# Patient Record
Sex: MALE | Race: BLACK OR AFRICAN AMERICAN | Employment: UNEMPLOYED | URBAN - METROPOLITAN AREA
[De-identification: names, ages, dates, MRNs, and addresses within clinical notes are randomized per-mention and may not be internally consistent; named-entity substitution may affect disease eponyms.]

---
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
## 2022-03-19 PROBLEM — H65.33 CHRONIC MUCOID OTITIS MEDIA OF BOTH EARS: Status: ACTIVE | Noted: 2021-12-08

## 2022-11-23 ENCOUNTER — OFFICE VISIT (OUTPATIENT)
Dept: ENT CLINIC | Age: 22
End: 2022-11-23
Payer: MEDICAID

## 2022-11-23 VITALS
HEART RATE: 80 BPM | DIASTOLIC BLOOD PRESSURE: 80 MMHG | BODY MASS INDEX: 24.17 KG/M2 | OXYGEN SATURATION: 97 % | HEIGHT: 69 IN | SYSTOLIC BLOOD PRESSURE: 122 MMHG | RESPIRATION RATE: 17 BRPM | WEIGHT: 163.2 LBS

## 2022-11-23 DIAGNOSIS — J36 PERITONSILLAR ABSCESS: Primary | ICD-10-CM

## 2022-11-23 DIAGNOSIS — H65.33 CHRONIC MUCOID OTITIS MEDIA OF BOTH EARS: ICD-10-CM

## 2022-11-23 PROCEDURE — 99213 OFFICE O/P EST LOW 20 MIN: CPT | Performed by: OTOLARYNGOLOGY

## 2022-11-23 PROCEDURE — 10021 FNA BX W/O IMG GDN 1ST LES: CPT | Performed by: OTOLARYNGOLOGY

## 2022-11-23 RX ORDER — PREDNISONE 20 MG/1
20 TABLET ORAL 2 TIMES DAILY
Qty: 6 TABLET | Refills: 0 | Status: SHIPPED | OUTPATIENT
Start: 2022-11-23 | End: 2022-11-26

## 2022-11-23 NOTE — PROGRESS NOTES
Subjective:    Saleem Arrow   22 y.o.   2000     Followup Visit    Location -ears    Quality -hearing loss    Severity -moderate    Duration -chronic    Timing -ongoing    Context -presents today 9 days postop bilateral tympanostomy tube placement for bilateral mucoid effusions and hearing loss    Modifying Features -tubes seem to help    Associated symptoms/signs -hearing loss    11/23/2022  1 year follow-up patient had tympanostomy tubes in November 2021. Did not follow-up as scheduled 6 months ago, here for new problem. States 9 days of sore throat worse on the right side. For started Z-Jordan did not help, went to ER this morning was given antibiotics, CT scan and clindamycin prescription    Review of Systems  Review of Systems   Constitutional:  Negative for chills and fever. HENT:  Positive for hearing loss and sore throat. Negative for ear pain, nosebleeds and tinnitus. Eyes:  Negative for blurred vision and double vision. Respiratory:  Negative for cough, sputum production and shortness of breath. Cardiovascular:  Negative for chest pain and palpitations. Gastrointestinal:  Negative for heartburn, nausea and vomiting. Musculoskeletal:  Positive for joint pain. Negative for neck pain. Skin: Negative. Neurological:  Negative for dizziness, speech change, weakness and headaches. Endo/Heme/Allergies:  Negative for environmental allergies. Does not bruise/bleed easily. Psychiatric/Behavioral:  Negative for memory loss. The patient does not have insomnia. Past Medical History:   Diagnosis Date    Asthma     COVID-19 vaccine series completed     Pfizer    Eczema     Otitis media     Seasonal allergic reaction      Prior to Admission medications    Medication Sig Start Date End Date Taking? Authorizing Provider   predniSONE (DELTASONE) 20 mg tablet Take 1 Tablet by mouth two (2) times a day for 3 days.  11/23/22 11/26/22 Yes Dudley Gupta MD   ofloxacin (FLOXIN) 0.3 % ophthalmic solution instill 1 to 2 drops INTO AFFECTED EYE(S) every 2 to 4 hours for . ..  (REFER TO PRESCRIPTION NOTES). Patient not taking: Reported on 11/23/2022 9/25/21   Provider, Historical            Objective:     Visit Vitals  /80   Pulse 80   Resp 17   Ht 5' 9\" (1.753 m)   Wt 163 lb 3.2 oz (74 kg)   SpO2 97%   BMI 24.10 kg/m²        Physical Exam  Vitals reviewed. Constitutional:       General: He is awake. Appearance: Normal appearance. He is normal weight. HENT:      Head: Normocephalic and atraumatic. Jaw: There is normal jaw occlusion. No trismus, tenderness or malocclusion. Salivary Glands: Right salivary gland is not diffusely enlarged or tender. Left salivary gland is not diffusely enlarged or tender. Right Ear: Hearing, ear canal and external ear normal. No PE tube. Tympanic membrane is retracted. Left Ear: Hearing, tympanic membrane, ear canal and external ear normal. No PE tube. Ears:      Couch exam findings: Does not lateralize. Right Rinne: AC > BC. Left Rinne: AC > BC. Nose: No septal deviation, mucosal edema or rhinorrhea. Right Turbinates: Not enlarged, swollen or pale. Left Turbinates: Not enlarged, swollen or pale. Right Sinus: No maxillary sinus tenderness or frontal sinus tenderness. Left Sinus: No maxillary sinus tenderness or frontal sinus tenderness. Mouth/Throat:      Lips: Pink. Mouth: Mucous membranes are moist. No oral lesions. Dentition: Normal dentition. No gum lesions. Tongue: No lesions. Palate: No mass and lesions. Pharynx: Oropharynx is clear. Uvula midline. Posterior oropharyngeal erythema present. Tonsils: Tonsillar exudate and tonsillar abscess present. 4+ on the right. 3+ on the left. Eyes:      General: Vision grossly intact. Extraocular Movements: Extraocular movements intact. Right eye: No nystagmus. Left eye: No nystagmus.       Pupils: Pupils are equal, round, and reactive to light. Neck:      Thyroid: No thyroid mass, thyromegaly or thyroid tenderness. Trachea: Trachea and phonation normal. No tracheal tenderness. Cardiovascular:      Rate and Rhythm: Normal rate and regular rhythm. Pulmonary:      Effort: Pulmonary effort is normal.      Breath sounds: Normal breath sounds. No stridor. No wheezing. Musculoskeletal:         General: Normal range of motion. Cervical back: Normal range of motion. No edema or erythema. Lymphadenopathy:      Cervical: No cervical adenopathy. Skin:     General: Skin is warm and dry. Neurological:      General: No focal deficit present. Mental Status: He is alert and oriented to person, place, and time. Mental status is at baseline. Coordination: Romberg sign negative. Gait: Gait is intact. Psychiatric:         Mood and Affect: Mood normal.         Behavior: Behavior normal. Behavior is cooperative. Needle Aspiration Peritonsillar Abscess    Patient throat is sprayed with Cetacaine. I then injected 2cc of 1% lidocaine with 1:100,000 parts epinephrine into the right peritonsillar mucosa. I then aspirated the peritonsillar space with an 18ga needle. 3 passes are made. Once all purulence was  aspirated, hemostasis was achieved with cold water rinse. Assessment/Plan:     Encounter Diagnoses   Name Primary? Peritonsillar abscess Yes    Chronic mucoid otitis media of both ears      Review [facility]. He has a right peritonsillar abscess, aspirated today. Agree with the clindamycin I am adding 3 days of prednisone and he should take ibuprofen for pain. His tympanostomy tubes are extruded and he has some mild retraction on the right side but no effusion noted at this time. Follow-up in 3 weeks to recheck his throat and we may do a follow-up hearing test to see if he needs tubes replaced.     Orders Placed This Encounter    FINE NEEDLE ASP W/O IMAGING GUIDANCE predniSONE (DELTASONE) 20 mg tablet     Follow-up and Dispositions    Return in about 3 weeks (around 12/14/2022). Canelo Ramirez MD, [address]    [facility]  [facility]    [address] [address]  [place], [place] [address] [address]

## 2022-11-23 NOTE — PROGRESS NOTES
Chief Complaint   Patient presents with    Follow-up     Pharyngitis       Visit Vitals  /80   Pulse 80   Resp 17   Ht 5' 9\" (1.753 m)   Wt 163 lb 3.2 oz (74 kg)   SpO2 97%   BMI 24.10 kg/m²

## 2025-05-22 ENCOUNTER — OFFICE VISIT (OUTPATIENT)
Age: 25
End: 2025-05-22
Payer: MEDICAID

## 2025-05-22 VITALS
HEART RATE: 94 BPM | OXYGEN SATURATION: 99 % | RESPIRATION RATE: 16 BRPM | HEIGHT: 69 IN | SYSTOLIC BLOOD PRESSURE: 124 MMHG | BODY MASS INDEX: 28.85 KG/M2 | WEIGHT: 194.8 LBS | DIASTOLIC BLOOD PRESSURE: 82 MMHG

## 2025-05-22 DIAGNOSIS — J03.01 ACUTE RECURRENT STREPTOCOCCAL TONSILLITIS: Primary | ICD-10-CM

## 2025-05-22 PROCEDURE — 99204 OFFICE O/P NEW MOD 45 MIN: CPT

## 2025-05-22 RX ORDER — METHYLPREDNISOLONE 4 MG/1
TABLET ORAL
Qty: 1 KIT | Refills: 0 | Status: SHIPPED | OUTPATIENT
Start: 2025-05-22

## 2025-05-22 NOTE — PROGRESS NOTES
[facility]    Subjective:   Patient: Kenneth Burciaga  YOB: 2000  MRN: [id number]  Date of Service:  5/22/2025    New Patient Visit    Chief Complaint: Tonsil infection    History of Present Illness:   Kenneth Burciaga is a 24 y.o. male who presents today for the evaluation of bilateral tonsillitis and right sided peritonsillar abscess diagnosed at the [facility] ER last night. He notes that he had been having a sore throat since Sunday, worsening over the course of this week.  Went to the ER last night because the sore throat was getting worse.  At the ER he did test positive for group A strep negative for flu type a, B, and COVID-19. A CT neck done there showed a small 14 mm right sided peritonsillar abscess.  In the ER he was given IV steroids, antibiotics, pain medication, and ultimately discharged with a 10-day course of clindamycin.  He notes he plans to pick that prescription up this morning and start his oral antibiotic course today    He states he feels much better today, his fevers and body aches have resolved, and sore throat has improved although not completely resolved.  Denies any shortness of breath. Notes that it is easier to breathe through his nose than his mouth. He does note some pain with swallowing but states that this is improved from yesterday, and he is able to swallow and keep food down. Denies any voice changes. Notes that he has been more comfortable sleeping reclined over the past couple of days instead of lying down, and that he slept better last night than previous nights this week. He states he has had issues with recurrent strep infections over the years and that he believes he has had a peritonsillar abscess before as well. He is ultimately interested in having his tonsils removed.    According to patient's chart, he had a peritonsillar abscess in Nov 2022 which was drained in clinic by Dr. Oleary. Was scheduled for follow up in 3 weeks but

## 2025-05-29 ENCOUNTER — OFFICE VISIT (OUTPATIENT)
Age: 25
End: 2025-05-29
Payer: MEDICAID

## 2025-05-29 VITALS
BODY MASS INDEX: 29.21 KG/M2 | WEIGHT: 197.2 LBS | RESPIRATION RATE: 15 BRPM | SYSTOLIC BLOOD PRESSURE: 140 MMHG | HEIGHT: 69 IN | DIASTOLIC BLOOD PRESSURE: 80 MMHG | OXYGEN SATURATION: 99 % | HEART RATE: 63 BPM

## 2025-05-29 DIAGNOSIS — J03.01 RECURRENT STREPTOCOCCAL TONSILLITIS: Primary | ICD-10-CM

## 2025-05-29 DIAGNOSIS — J36 PERITONSILLAR ABSCESS: ICD-10-CM

## 2025-05-29 PROCEDURE — 99213 OFFICE O/P EST LOW 20 MIN: CPT

## 2025-05-29 ASSESSMENT — PATIENT HEALTH QUESTIONNAIRE - PHQ9
2. FEELING DOWN, DEPRESSED OR HOPELESS: SEVERAL DAYS
1. LITTLE INTEREST OR PLEASURE IN DOING THINGS: NOT AT ALL
SUM OF ALL RESPONSES TO PHQ QUESTIONS 1-9: 1

## 2025-05-29 NOTE — PROGRESS NOTES
Identified pt with two pt identifiers(name and ). Reviewed record in preparation for visit and have obtained necessary documentation. All patient medications has been reviewed.  Chief Complaint   Patient presents with    Follow-up    Pharyngitis     Pt states he had laryngitis for about 4-5 days at once and is following up.       Wt Readings from Last 3 Encounters:   25 89.4 kg (197 lb 3.2 oz)   25 88.4 kg (194 lb 12.8 oz)   22 74 kg (163 lb 3.2 oz)     Temp Readings from Last 3 Encounters:   No data found for Temp     BP Readings from Last 3 Encounters:   25 (!) 140/80   25 124/82   22 122/80     Pulse Readings from Last 3 Encounters:   25 63   25 94   22 80       Have you been to the ER, urgent care clinic since your last visit?  Hospitalized since your last visit?   No    Have you seen or consulted any other health care providers outside our system since your last visit?   NO
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
## 2025-06-16 ENCOUNTER — PROCEDURE VISIT (OUTPATIENT)
Age: 25
End: 2025-06-16
Payer: MEDICAID

## 2025-06-16 ENCOUNTER — OFFICE VISIT (OUTPATIENT)
Age: 25
End: 2025-06-16
Payer: MEDICAID

## 2025-06-16 VITALS
OXYGEN SATURATION: 97 % | BODY MASS INDEX: 29.38 KG/M2 | RESPIRATION RATE: 16 BRPM | HEART RATE: 59 BPM | WEIGHT: 198.4 LBS | DIASTOLIC BLOOD PRESSURE: 76 MMHG | SYSTOLIC BLOOD PRESSURE: 108 MMHG | HEIGHT: 69 IN

## 2025-06-16 DIAGNOSIS — H69.90 DYSFUNCTION OF EUSTACHIAN TUBE, UNSPECIFIED LATERALITY: ICD-10-CM

## 2025-06-16 DIAGNOSIS — H90.12 CONDUCTIVE HEARING LOSS OF LEFT EAR WITH UNRESTRICTED HEARING OF RIGHT EAR: Primary | ICD-10-CM

## 2025-06-16 DIAGNOSIS — H90.12 CONDUCTIVE HEARING LOSS OF LEFT EAR WITH UNRESTRICTED HEARING OF RIGHT EAR: ICD-10-CM

## 2025-06-16 DIAGNOSIS — J03.01 RECURRENT STREPTOCOCCAL TONSILLITIS: Primary | ICD-10-CM

## 2025-06-16 DIAGNOSIS — J30.9 ALLERGIC RHINITIS, UNSPECIFIED SEASONALITY, UNSPECIFIED TRIGGER: ICD-10-CM

## 2025-06-16 DIAGNOSIS — J36 PERITONSILLAR ABSCESS: ICD-10-CM

## 2025-06-16 PROCEDURE — 99213 OFFICE O/P EST LOW 20 MIN: CPT

## 2025-06-16 PROCEDURE — 92567 TYMPANOMETRY: CPT

## 2025-06-16 PROCEDURE — 92557 COMPREHENSIVE HEARING TEST: CPT

## 2025-06-16 RX ORDER — CLINDAMYCIN HYDROCHLORIDE 150 MG/1
CAPSULE ORAL
COMMUNITY
Start: 2025-05-22 | End: 2025-06-16 | Stop reason: ALTCHOICE

## 2025-06-16 RX ORDER — AZELASTINE HYDROCHLORIDE, FLUTICASONE PROPIONATE 137; 50 UG/1; UG/1
1 SPRAY, METERED NASAL 2 TIMES DAILY
Qty: 23 G | Refills: 3 | Status: SHIPPED | OUTPATIENT
Start: 2025-06-16
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
## 2025-06-16 NOTE — PROGRESS NOTES
Kenneth Burciaga   2000, 24 y.o. male   [id number]       Referring Provider: Arina Ch PA-C    AUDIOLOGIC EVALUATION      Kenneth Burciaga is a 24 y.o. male seen today, 6/16/2025, for an audiologic evaluation. Patient was seen by otolaryngology following today's evaluation.    PERTINENT MEDICAL HISTORY:      Kenneth Burciaga has reported a longstanding history of hearing loss. Previous audiogram showed normal hearing with mild hearing loss at 250 Hz in the left ear only. He notes no perceived changes to his hearing since his previous audiogram, noting that his left ear has consistently been poorer than his right.    He denied otalgia, aural fullness, otorrhea, tinnitus, dizziness, and imbalance.    IMPRESSIONS:      Today's results revealed mild conductive hearing loss rising to normal hearing in the left ear and normal hearing in the right ear. Excellent speech understanding when in quiet. Tympanometry indicates negative pressure in the middle ear space, consistent with abnormal middle ear/Eustachian tube function.    Follow medical recommendations of primary care physician and referring provider.    ASSESSMENT AND FINDINGS:     Otoscopy: Clear ear canals and normal tympanic membranes      RIGHT EAR:  Hearing Sensitivity: Normal hearing sensitivity 250-8000 Hz. Conductive component at 250 and 4000 Hz  Speech Reception Threshold: 25 dB HL  Word Recognition: Excellent 100%, based on NU-6 by-difficulty list at 60 dB HL using recorded speech stimuli  Tympanometry: Type C, negative pressure in the middle ear space, consistent with Eustachian tube dysfunction      LEFT EAR:  Hearing Sensitivity: Mild conductive hearing loss rising to normal hearing  Speech Reception Threshold: 25 dB HL  Word Recognition: Excellent 100%, based on NU-6 by-difficulty list at 60 dB HL using recorded speech stimuli  Tympanometry: Type C, negative pressure in the middle ear space, consistent with Eustachian tube

## 2025-08-27 ENCOUNTER — OFFICE VISIT (OUTPATIENT)
Age: 25
End: 2025-08-27
Payer: MEDICAID

## 2025-08-27 VITALS
OXYGEN SATURATION: 98 % | DIASTOLIC BLOOD PRESSURE: 80 MMHG | BODY MASS INDEX: 29.3 KG/M2 | RESPIRATION RATE: 18 BRPM | SYSTOLIC BLOOD PRESSURE: 122 MMHG | HEIGHT: 69 IN | HEART RATE: 63 BPM

## 2025-08-27 DIAGNOSIS — H69.93 DYSFUNCTION OF BOTH EUSTACHIAN TUBES: ICD-10-CM

## 2025-08-27 DIAGNOSIS — J30.9 ALLERGIC RHINITIS, UNSPECIFIED SEASONALITY, UNSPECIFIED TRIGGER: ICD-10-CM

## 2025-08-27 DIAGNOSIS — J03.01 RECURRENT STREPTOCOCCAL TONSILLITIS: Primary | ICD-10-CM

## 2025-08-27 PROCEDURE — 99214 OFFICE O/P EST MOD 30 MIN: CPT | Performed by: OTOLARYNGOLOGY

## (undated) DEVICE — TUBING SUCTION UNIV 3/16 INX20 FT W/ SCALLOPED CONN STRL

## (undated) DEVICE — GLOVE ORANGE PI 7 1/2   MSG9075

## (undated) DEVICE — BLADE BEAV SPEAR TIP 45 DEG --

## (undated) DEVICE — TOWEL,OR,DSP,ST,BLUE,STD,4/PK,20PK/CS: Brand: MEDLINE

## (undated) DEVICE — CONTAINER,SPEC,PNEUM TUBE,3OZ,STRL PATH: Brand: MEDLINE